# Patient Record
Sex: FEMALE | Race: WHITE
[De-identification: names, ages, dates, MRNs, and addresses within clinical notes are randomized per-mention and may not be internally consistent; named-entity substitution may affect disease eponyms.]

---
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
## 2022-01-02 ENCOUNTER — HOSPITAL ENCOUNTER (EMERGENCY)
Dept: HOSPITAL 63 - ER | Age: 22
LOS: 1 days | Discharge: HOME | End: 2022-01-03
Payer: COMMERCIAL

## 2022-01-02 VITALS — BODY MASS INDEX: 45.99 KG/M2 | WEIGHT: 293 LBS | HEIGHT: 67 IN

## 2022-01-02 DIAGNOSIS — Z20.822: ICD-10-CM

## 2022-01-02 DIAGNOSIS — R10.84: ICD-10-CM

## 2022-01-02 DIAGNOSIS — R19.7: ICD-10-CM

## 2022-01-02 DIAGNOSIS — R11.2: Primary | ICD-10-CM

## 2022-01-02 PROCEDURE — 80053 COMPREHEN METABOLIC PANEL: CPT

## 2022-01-02 PROCEDURE — 96375 TX/PRO/DX INJ NEW DRUG ADDON: CPT

## 2022-01-02 PROCEDURE — 85025 COMPLETE CBC W/AUTO DIFF WBC: CPT

## 2022-01-02 PROCEDURE — 85007 BL SMEAR W/DIFF WBC COUNT: CPT

## 2022-01-02 PROCEDURE — 96361 HYDRATE IV INFUSION ADD-ON: CPT

## 2022-01-02 PROCEDURE — 96374 THER/PROPH/DIAG INJ IV PUSH: CPT

## 2022-01-02 PROCEDURE — 81001 URINALYSIS AUTO W/SCOPE: CPT

## 2022-01-02 PROCEDURE — 81025 URINE PREGNANCY TEST: CPT

## 2022-01-02 PROCEDURE — C9803 HOPD COVID-19 SPEC COLLECT: HCPCS

## 2022-01-02 PROCEDURE — 99284 EMERGENCY DEPT VISIT MOD MDM: CPT

## 2022-01-02 PROCEDURE — U0003 INFECTIOUS AGENT DETECTION BY NUCLEIC ACID (DNA OR RNA); SEVERE ACUTE RESPIRATORY SYNDROME CORONAVIRUS 2 (SARS-COV-2) (CORONAVIRUS DISEASE [COVID-19]), AMPLIFIED PROBE TECHNIQUE, MAKING USE OF HIGH THROUGHPUT TECHNOLOGIES AS DESCRIBED BY CMS-2020-01-R: HCPCS

## 2022-01-03 VITALS — DIASTOLIC BLOOD PRESSURE: 105 MMHG | SYSTOLIC BLOOD PRESSURE: 158 MMHG

## 2022-01-03 LAB
% LYMPHS: 9 % (ref 24–48)
% MONOS: 6 % (ref 0–10)
% SEGS: 85 % (ref 35–66)
ALBUMIN SERPL-MCNC: 3.3 G/DL (ref 3.4–5)
ALBUMIN/GLOB SERPL: 0.8 {RATIO} (ref 1–1.7)
ALP SERPL-CCNC: 111 U/L (ref 46–116)
ALT SERPL-CCNC: 31 U/L (ref 14–59)
ANION GAP SERPL CALC-SCNC: 10 MMOL/L (ref 6–14)
APTT PPP: YELLOW S
AST SERPL-CCNC: 25 U/L (ref 15–37)
BACTERIA #/AREA URNS HPF: (no result) /HPF
BASOPHILS # BLD AUTO: 0 X10^3/UL (ref 0–0.2)
BASOPHILS NFR BLD: 0 % (ref 0–3)
BILIRUB SERPL-MCNC: 0.3 MG/DL (ref 0.2–1)
BILIRUB UR QL STRIP: (no result)
BUN/CREAT SERPL: 23 (ref 6–20)
CA-I SERPL ISE-MCNC: 14 MG/DL (ref 7–20)
CALCIUM SERPL-MCNC: 9.2 MG/DL (ref 8.5–10.1)
CHLORIDE SERPL-SCNC: 105 MMOL/L (ref 98–107)
CO2 SERPL-SCNC: 24 MMOL/L (ref 21–32)
CREAT SERPL-MCNC: 0.6 MG/DL (ref 0.6–1)
EOSINOPHIL NFR BLD: 0.2 X10^3/UL (ref 0–0.7)
EOSINOPHIL NFR BLD: 1 % (ref 0–3)
ERYTHROCYTE [DISTWIDTH] IN BLOOD BY AUTOMATED COUNT: 13.1 % (ref 11.5–14.5)
FIBRINOGEN PPP-MCNC: (no result) MG/DL
GFR SERPLBLD BASED ON 1.73 SQ M-ARVRAT: 128.8 ML/MIN
GLOBULIN SER-MCNC: 4 G/DL (ref 2.2–3.8)
GLUCOSE SERPL-MCNC: 113 MG/DL (ref 70–99)
GLUCOSE UR STRIP-MCNC: (no result) MG/DL
HCT VFR BLD CALC: 48.3 % (ref 36–47)
HGB BLD-MCNC: 15.8 G/DL (ref 12–15.5)
LYMPHOCYTES # BLD: 2.2 X10^3/UL (ref 1–4.8)
LYMPHOCYTES NFR BLD AUTO: 9 % (ref 24–48)
MCH RBC QN AUTO: 29 PG (ref 25–35)
MCHC RBC AUTO-ENTMCNC: 33 G/DL (ref 31–37)
MCV RBC AUTO: 87 FL (ref 79–100)
MONO #: 1.4 X10^3/UL (ref 0–1.1)
MONOCYTES NFR BLD: 6 % (ref 0–9)
NEUT #: 21.2 X10^3UL (ref 1.8–7.7)
NEUTROPHILS NFR BLD AUTO: 85 % (ref 31–73)
NITRITE UR QL STRIP: (no result)
PLATELET # BLD AUTO: 492 X10^3/UL (ref 140–400)
PLATELET # BLD EST: ADEQUATE 10*3/UL
POTASSIUM SERPL-SCNC: 4.9 MMOL/L (ref 3.5–5.1)
PROT SERPL-MCNC: 7.3 G/DL (ref 6.4–8.2)
RBC # BLD AUTO: 5.54 X10^6/UL (ref 3.5–5.4)
RBC #/AREA URNS HPF: 0 /HPF (ref 0–2)
SODIUM SERPL-SCNC: 139 MMOL/L (ref 136–145)
SP GR UR STRIP: >=1.03
SQUAMOUS #/AREA URNS LPF: (no result) /LPF
UROBILINOGEN UR-MCNC: 0.2 MG/DL
WBC # BLD AUTO: 25 X10^3/UL (ref 4–11)
WBC #/AREA URNS HPF: (no result) /HPF (ref 0–4)

## 2022-01-03 NOTE — PHYS DOC
Past History


Additional Past Medical Histor:  thyroid disease


Past Surgical History:  Other


Additional Past Surgical Histo:  nasal surgery


Alcohol Use:  None





General Adult


EDM:


Chief Complaint:  NAUSEA/VOMITING/DIARRHEA





HPI:


HPI:


21-year-old female presents with vomiting and diarrhea.  She started vomiting 

around 9 PM.  She has had several episodes and has also had diarrhea.  She was 

feeling fine prior to this.  She was at work when she started to feel poorly.  

The patient is not fully vaccinated against COVID-19.  She has not had COVID-19 

as far she knows.  She is in generalized abdominal pain that started after the 

vomiting.  Denies fever or chills.





Review of Systems:


Review of Systems:


Constitutional:  Denies fever or chills 


Eyes:  Denies change in visual acuity 


HENT:  Denies nasal congestion or sore throat 


Respiratory:  Denies cough or shortness of breath 


Cardiovascular:  Denies chest pain or edema 


GI: Generalized abdominal pain, nausea, vomiting, diarrhea.


: Denies dysuria 


Musculoskeletal:  Denies back pain or joint pain 


Integument:  Denies rash 


Neurologic:  Denies headache, focal weakness or sensory changes 


Endocrine:  Denies polyuria or polydipsia 


Lymphatic:  Denies swollen glands 


Psychiatric:  Denies depression or anxiety





Current Medications:


Current Meds:





Current Medications








 Medications


  (Trade)  Dose


 Ordered  Sig/Justin  Start Time


 Stop Time Status Last Admin


Dose Admin


 


 Ondansetron HCl


  (Zofran)  4 mg  1X  ONCE  1/3/22 00:30


 1/3/22 00:31   





 


 Sodium Chloride  1,000 ml @ 


 1,000 mls/hr  1X  ONCE  1/3/22 00:30


 1/3/22 01:29   














Allergies:


Allergies:





Allergies








Coded Allergies Type Severity Reaction Last Updated Verified


 


  No Known Drug Allergies    1/3/22 No











Physical Exam:


PE:





Constitutional: Well developed, well nourished, morbidly obese, no acute 

distress, non-toxic appearance. []


HENT: Normocephalic, atraumatic, bilateral external ears normal, oropharynx 

moist, no oral exudates, nose normal. []


Eyes: PERRLA, EOMI, conjunctiva normal, no discharge. [] 


Neck: Normal range of motion, no tenderness, supple, no stridor. [] 


Cardiovascular: Heart rate regular rhythm, no murmur []


Lungs & Thorax:  Bilateral breath sounds clear to auscultation []


Abdomen: Bowel sounds normal, soft, mild upper abdominal tenderness, no masses, 

no pulsatile masses. [] 


Skin: Warm, dry, no erythema, no rash. [] 


Back: No tenderness, no CVA tenderness. [] 


Extremities: No tenderness, no cyanosis, no clubbing, ROM intact, no edema. [] 


Neurologic: Alert and oriented X 3, normal motor function, normal sensory 

function, no focal deficits noted. []


Psychologic: Affect normal, judgement normal, mood normal. []





Current Patient Data:


Labs:





                                Laboratory Tests








Test


 1/3/22


00:21


 


POC Urine HCG, Qualitative


 hcg negative


(Negative)








Vital Signs:





                                   Vital Signs








  Date Time  Temp Pulse Resp B/P (MAP) Pulse Ox O2 Delivery O2 Flow Rate FiO2


 


1/3/22 00:01 98.2 119 18 158/105 (122) 97   











EKG:


EKG:


[]





Radiology/Procedures:


Radiology/Procedures:


[]





Heart Score:


C/O Chest Pain:  N/A


Risk Factors:


Risk Factors:  DM, Current or recent (<one month) smoker, HTN, HLP, family 

history of CAD, obesity.


Risk Scores:


Score 0 - 3:  2.5% MACE over next 6 weeks - Discharge Home


Score 4 - 6:  20.3% MACE over next 6 weeks - Admit for Clinical Observation


Score 7 - 10:  72.7% MACE over next 6 weeks - Early Invasive Strategies





Course & Med Decision Making:


Course & Med Decision Making


Pertinent Labs and Imaging studies reviewed. (See chart for details)


The patient has significant elevated white count.  She also has evidence of 

general hemoconcentration.  We have hydrated her with fluids.  Her vomiting was 

controlled with Zofran.  I do not see any focal source of infection.  This is 

likely viral gastroenteritis or COVID-19.  I have advised she isolate until she 

gets her results.  She is stable for discharge at this time.


[]





Dragon Disclaimer:


Dragon Disclaimer:


This electronic medical record was generated, in whole or in part, using a voice

 recognition dictation system.





Departure


Departure:


Impression:  


   Primary Impression:  


   Vomiting and diarrhea


   Additional Impression:  


   Suspected 2019 novel coronavirus infection


Disposition:  01 HOME / SELF CARE / HOMELESS


Condition:  STABLE


Referrals:  


ALFREDO JOYNER (PCP)


Patient Instructions:  Viral Syndrome





Additional Instructions:  


You have been tested for or diagnosed with COVID-19. It is an infection caused 

by a new type 





of coronavirus. COVID-19 will cause cold-like or mild flu symptoms in most. It 

can cause 


more severe symptoms like problems breathing in some.





There is no treatment for COVID-19. The body will clear the infection over time.

 Self-care 


will help to ease discomfort.





Steps to Take:


Self-Care


Rest as needed. Healthy habits may help you feel better. Steps include:





Choose healthy foods including fruits and vegetables. Drink water throughout the

 day.


Get plenty of sleep each night.


If you smoke, try to quit. It may ease breathing.


Avoid alcohol.


Keep Others Healthy


The virus can spread to others. Droplets are released every time you sneeze or 

cough. The 


droplets can get into the mouth, nose, or eyes of people near you and lead to i

nfection. To 


lower the chances of spreading COVID-19 to others:





Stay at home until your doctor has said it is safe to leave. If you tested 

positive this 


will mean staying isolated until both of the following are true:





At least 7 days have passed since the start of illness.


You are free of fever for at least 72 hours without the use of medicine.


During this time:





 - Avoid public areas, events, or transportation. Do not return to work or 

school until your 





doctor has said it is safe to do so.


 - Call ahead if you need to go to a medical center. Let them know you may have 

COVID-19. It 





will help them guide you where to go. They may also ask you to wear a facemask 

when you come 





to the office.


 - If you call for emergency medical services, let them know you may have COVID-

19.


While at home:





 - Try to avoid close contact with others. Stay about 6 feet away.


 - If possible, spend most of your time in a separate room from others.


 - Use a face mask if you will be in close contact with others such as sharing a

 room or 


vehicle.


 - Have someone wipe down common surfaces in the home. Use household  

every day on 


areas like doorknobs, counters, or sinks.


 - Cough or sneeze into a tissue. Throw the tissue away right after use. If a 

tissue is not 


available, cough or sneeze into your elbow.


 - Wash your hands often. Wash them after sneezing or coughing. Use soap and 

water and wash 


for at least 20 seconds. Alcohol based hand  can be used if soap and 

water is not 


available.


 - Do not prepare food for others. Avoid sharing personal items like forks, 

spoons, or 


toothbrushes.


 - Avoid close contact with pets while you are sick. There is no evidence of the

 virus 


passing to pets. This is a safety step until more is known about this virus.


Isolation can be frustrating. Social interaction can help. Keep in touch with 

friends and 


family through phone and tech options. You can still interact with others in 

your home, just 





keep a safe distance of about 6 feet.





Follow-up:


Your doctors office will check in with you to see if there are any changes in 

your health. 


You may be asked to keep track of symptoms to share with them. They will also 

let you know 


when you are clear to be in public again.





Problems to Look Out For:


Contact your doctor if your recovery is not going as you expect. Get emergency 

care if you 


have problems such as:





 - Trouble breathing


 - Nonstop chest pain or pressure


 - Changes in awareness, confusion, or problems waking


 - Lips or face have bluish color


 - Worsening of symptoms


If you think you have an emergency, call for emergency medical services right 

away.





As taken from [facility]











NIKOLAS CHAN DO                  Mir 3, 2022 00:35